# Patient Record
(demographics unavailable — no encounter records)

---

## 2025-03-31 NOTE — CONSULT LETTER
[Dear  ___] : Dear  [unfilled], [Courtesy Letter:] : I had the pleasure of seeing your patient, [unfilled], in my office today. [Please see my note below.] : Please see my note below. [Sincerely,] : Sincerely, [FreeTextEntry2] : Radha Burns [FreeTextEntry3] : Aquilino Junior MD Otolaryngology at [address], [address] [place] [address] Phone: [phone] Fax: [phone]

## 2025-03-31 NOTE — HISTORY OF PRESENT ILLNESS
[de-identified] : 29 year old female here for nasal injury after incidental trauma to nose 3/19/2025 - toddler struck pt nose with his head. Pt [facility] seen [facility] 3/21/2025 with x rays obtained "IMPRESION: Lucency is identified along the base of the right and left nasal bones compatible with nondisplaced fracture. There is altered morphology of the anterior nasal spine of the maxilla which may be related to a prior injury. The bilateral frontal, maxillary sinuses as well as the bilateral mastoid air cells are well aerated. The bilateral orbital rims and orbital floors are intact." pt referred to f/u with ENT. PT reports mild difficulty through the left nostril, pt reports pain on palpation of nose and frequent severe headaches. Denies epistaxis.

## 2025-03-31 NOTE — ASSESSMENT
[FreeTextEntry1] : Nondisplaced nasal fracture. No cosmetic deformity, no septal hematoma. No intervention needed at this time. Seek attention for epistaxis, nasal discharge, facial pain/pressure, fever, chills, headache. F/u PRN